# Patient Record
Sex: FEMALE | URBAN - METROPOLITAN AREA
[De-identification: names, ages, dates, MRNs, and addresses within clinical notes are randomized per-mention and may not be internally consistent; named-entity substitution may affect disease eponyms.]

---

## 2023-03-10 ENCOUNTER — APPOINTMENT (RX ONLY)
Dept: URBAN - METROPOLITAN AREA CLINIC 152 | Facility: CLINIC | Age: 68
Setting detail: DERMATOLOGY
End: 2023-03-10

## 2023-03-10 DIAGNOSIS — D49.2 NEOPLASM OF UNSPECIFIED BEHAVIOR OF BONE, SOFT TISSUE, AND SKIN: ICD-10-CM

## 2023-03-10 DIAGNOSIS — L30.4 ERYTHEMA INTERTRIGO: ICD-10-CM

## 2023-03-10 DIAGNOSIS — L72.8 OTHER FOLLICULAR CYSTS OF THE SKIN AND SUBCUTANEOUS TISSUE: ICD-10-CM

## 2023-03-10 PROBLEM — D48.5 NEOPLASM OF UNCERTAIN BEHAVIOR OF SKIN: Status: ACTIVE | Noted: 2023-03-10

## 2023-03-10 PROCEDURE — ? PRESCRIPTION

## 2023-03-10 PROCEDURE — ? DIAGNOSIS COMMENT

## 2023-03-10 PROCEDURE — ? COUNSELING

## 2023-03-10 PROCEDURE — ? PRESCRIPTION MEDICATION MANAGEMENT

## 2023-03-10 PROCEDURE — 99204 OFFICE O/P NEW MOD 45 MIN: CPT

## 2023-03-10 RX ORDER — CLINDAMYCIN PHOSPHATE 10 MG/G
GEL TOPICAL
Qty: 30 | Refills: 2 | Status: ERX | COMMUNITY
Start: 2023-03-10

## 2023-03-10 RX ORDER — KETOCONAZOLE 20 MG/G
CREAM TOPICAL
Qty: 30 | Refills: 0 | Status: ERX | COMMUNITY
Start: 2023-03-10

## 2023-03-10 RX ORDER — HYDROCORTISONE 25 MG/G
CREAM TOPICAL
Qty: 28.35 | Refills: 0 | Status: ERX | COMMUNITY
Start: 2023-03-10

## 2023-03-10 RX ADMIN — HYDROCORTISONE: 25 CREAM TOPICAL at 00:00

## 2023-03-10 RX ADMIN — CLINDAMYCIN PHOSPHATE: 10 GEL TOPICAL at 00:00

## 2023-03-10 RX ADMIN — KETOCONAZOLE: 20 CREAM TOPICAL at 00:00

## 2023-03-10 ASSESSMENT — LOCATION ZONE DERM
LOCATION ZONE: TRUNK
LOCATION ZONE: FACE
LOCATION ZONE: TRUNK
LOCATION ZONE: FACE

## 2023-03-10 ASSESSMENT — LOCATION DETAILED DESCRIPTION DERM
LOCATION DETAILED: RIGHT RIB CAGE
LOCATION DETAILED: LEFT RIB CAGE
LOCATION DETAILED: EPIGASTRIC SKIN
LOCATION DETAILED: LEFT MEDIAL MALAR CHEEK
LOCATION DETAILED: LEFT MEDIAL MALAR CHEEK

## 2023-03-10 ASSESSMENT — LOCATION SIMPLE DESCRIPTION DERM
LOCATION SIMPLE: ABDOMEN
LOCATION SIMPLE: LEFT CHEEK
LOCATION SIMPLE: LEFT CHEEK
LOCATION SIMPLE: ABDOMEN

## 2023-03-10 NOTE — PROCEDURE: PRESCRIPTION MEDICATION MANAGEMENT
Detail Level: Zone
Initiate Treatment: hydrocortisone 2.5 % topical cream \\nketoconazole 2 % topical cream \\nclindamycin 1 % topical gel
Render In Strict Bullet Format?: No

## 2023-03-10 NOTE — PROCEDURE: DIAGNOSIS COMMENT
Detail Level: Detailed
Comment: Chronic, flaring with prior treatments including topical mupiricon and betamethasone/clotrimazole per PCP. Reviewed treatment options-for current flare will star 50:50 HCT 2.5% cream and ketoconazole 2% cream BID x 5-7 days for rash. For acneiform lesions start topical Clindamycin gel BID for flares. In between flares, recommend OTC desitin or Zeasorb powder. Counseling as below.
Render Risk Assessment In Note?: no
Comment: Lesion of pt concern per HPI-ddx cyst vs dilated pore. Pt interested in removal, given location-recommend pt sees plastic surgeon Pipo Crawley or Jonnie ramsey MD

## 2023-03-10 NOTE — HPI: OTHER
Condition:: rash on abdomen and under breast and skin growth on R cheek.
Please Describe Your Condition:: This is a 67 year old female who is a new patient who is being seen for a chief complaint of rash on abdomen and under breast and skin growth on R cheek. For rash on abdomen and breast present for 1 year. Rash is itchy and some pus filled bumps form. Went to go see her PCP about 1 month ago who advised it was a fungus and told pt to treat with Mupirocin and clotrimazole-betamethasone creams which helped, but when she stopped using it, the rash re-flares. Denies rash elsewhere on body and no changes to medical health (surgeries, new medications, new medical problems), prior to rash onset. Also has skin growth on R cheek that she would be interested in removal.

## 2023-03-10 NOTE — PROCEDURE: PRESCRIPTION MEDICATION MANAGEMENT
Render In Strict Bullet Format?: No
Initiate Treatment: Hydrocortisone 2.5% topical cream\\nKetaconazole 2% topical cream\\nClindamycin 1% topical gel
Detail Level: Zone

## 2023-03-10 NOTE — PROCEDURE: DIAGNOSIS COMMENT
Render Risk Assessment In Note?: no
Detail Level: Detailed
Comment: Chronic, flaring with prior treatments including topical mupirocin and betametheasone/clotrimazole per PCP. Reviewed treatment options- for current flare will start 50:50 HCT 2.5% cream and ketoconazole 2% cream BID x 5-7 days for rash. For acneiform lesions start topical Clindamycin gel BID for flares. In between flares, recommend OTC Desitin or Zeasorb powder. Counseling as below.
Comment: Lesion of pt’s concern per HPI- ddx cyst vs dilated pore. Pt interested in removal, given location- recommend pt sees plastic surgeons Cleve Hubbard or Pieter Torres MD.

## 2023-03-10 NOTE — HPI: OTHER
Condition:: rash on abdomen and under breast
Please Describe Your Condition:: is a new patient who is being seen for a chief complaint of rash on abdomen and under breast and skin growth on R cheek. For rash on abdomen and breast present for 1 year. Rash is itchy and some pus filled bumps form. Went to go see her PCP about 1 month ago who advised it was a fungus and told pt to treat with Mupirocin and clotrimazole-betamethasone creams which helped, but when she stopped using it, the rash re-flares. Denies rash elsewhere on body and no changes to medical health (surgeries, new medications, new medical problems), prior to rash onset. Also has skin growth on R cheek that she would be interested in removal.

## 2023-09-11 ENCOUNTER — RX ONLY (OUTPATIENT)
Age: 68
Setting detail: RX ONLY
End: 2023-09-11

## 2023-09-11 RX ORDER — KETOCONAZOLE 20 MG/G
CREAM TOPICAL
Qty: 30 | Refills: 3 | Status: ERX

## 2023-09-11 RX ORDER — HYDROCORTISONE 25 MG/G
CREAM TOPICAL
Qty: 28.35 | Refills: 3 | Status: ERX

## 2025-01-09 ENCOUNTER — APPOINTMENT (OUTPATIENT)
Dept: URBAN - METROPOLITAN AREA CLINIC 151 | Facility: CLINIC | Age: 70
Setting detail: DERMATOLOGY
End: 2025-01-09

## 2025-01-09 DIAGNOSIS — L30.4 ERYTHEMA INTERTRIGO: ICD-10-CM

## 2025-01-09 PROCEDURE — ? PRESCRIPTION MEDICATION MANAGEMENT

## 2025-01-09 PROCEDURE — ? COUNSELING

## 2025-01-09 PROCEDURE — ? PRESCRIPTION

## 2025-01-09 PROCEDURE — 99213 OFFICE O/P EST LOW 20 MIN: CPT

## 2025-01-09 PROCEDURE — ? DIAGNOSIS COMMENT

## 2025-01-09 RX ORDER — HYDROCORTISONE 25 MG/G
CREAM TOPICAL
Qty: 20 | Refills: 2 | Status: ERX | COMMUNITY
Start: 2025-01-09

## 2025-01-09 RX ORDER — KETOCONAZOLE 20 MG/G
CREAM TOPICAL
Qty: 30 | Refills: 2 | Status: ERX | COMMUNITY
Start: 2025-01-09

## 2025-01-09 RX ADMIN — HYDROCORTISONE: 25 CREAM TOPICAL at 00:00

## 2025-01-09 RX ADMIN — KETOCONAZOLE: 20 CREAM TOPICAL at 00:00

## 2025-01-09 ASSESSMENT — LOCATION SIMPLE DESCRIPTION DERM: LOCATION SIMPLE: ABDOMEN

## 2025-01-09 ASSESSMENT — LOCATION ZONE DERM: LOCATION ZONE: TRUNK

## 2025-01-09 ASSESSMENT — LOCATION DETAILED DESCRIPTION DERM: LOCATION DETAILED: EPIGASTRIC SKIN

## 2025-01-09 NOTE — PROCEDURE: DIAGNOSIS COMMENT
Detail Level: Detailed
Comment: chronic, flaring under the breast. The plan is to restart hydrocortisone 2.5% cream (SEs reviewed, instructed to take breaks) and ketoconazole cream (50/50 combination), applying a pea-sized amount to the affected area twice daily for 7-10 days. Clindamycin gel will be introduced for pimple-like bumps, to be applied twice daily. Once the area is clear, Zeasorb AF powder was recommended to apply once daily for maintenance.
Render Risk Assessment In Note?: no

## 2025-01-09 NOTE — HPI: OTHER
Condition:: Rash
Please Describe Your Condition:: 69-year-old female presenting for a follow-up regarding intertrigo under the breast. She reports ongoing redness and irritation in the area. She previously used hydrocortisone 2.5% cream and ketoconazole cream, which were effective in managing the condition